# Patient Record
Sex: MALE | Race: OTHER | NOT HISPANIC OR LATINO | URBAN - METROPOLITAN AREA
[De-identification: names, ages, dates, MRNs, and addresses within clinical notes are randomized per-mention and may not be internally consistent; named-entity substitution may affect disease eponyms.]

---

## 2019-04-29 ENCOUNTER — EMERGENCY (EMERGENCY)
Facility: HOSPITAL | Age: 24
LOS: 0 days | Discharge: HOME | End: 2019-04-29
Attending: EMERGENCY MEDICINE | Admitting: EMERGENCY MEDICINE
Payer: SUBSIDIZED

## 2019-04-29 VITALS
DIASTOLIC BLOOD PRESSURE: 62 MMHG | HEART RATE: 68 BPM | RESPIRATION RATE: 18 BRPM | TEMPERATURE: 98 F | OXYGEN SATURATION: 99 % | SYSTOLIC BLOOD PRESSURE: 122 MMHG

## 2019-04-29 DIAGNOSIS — M79.672 PAIN IN LEFT FOOT: ICD-10-CM

## 2019-04-29 DIAGNOSIS — M79.673 PAIN IN UNSPECIFIED FOOT: ICD-10-CM

## 2019-04-29 DIAGNOSIS — Z79.1 LONG TERM (CURRENT) USE OF NON-STEROIDAL ANTI-INFLAMMATORIES (NSAID): ICD-10-CM

## 2019-04-29 PROCEDURE — 73630 X-RAY EXAM OF FOOT: CPT | Mod: 26,LT

## 2019-04-29 PROCEDURE — 99283 EMERGENCY DEPT VISIT LOW MDM: CPT

## 2019-04-29 RX ORDER — IBUPROFEN 200 MG
600 TABLET ORAL ONCE
Qty: 0 | Refills: 0 | Status: COMPLETED | OUTPATIENT
Start: 2019-04-29 | End: 2019-04-29

## 2019-04-29 RX ADMIN — Medication 600 MILLIGRAM(S): at 15:04
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
## 2019-04-29 NOTE — ED PROVIDER NOTE - NSFOLLOWUPCLINICS_GEN_ALL_ED_FT
[facility]  Podiatry  .  [place]   Phone: [phone]  Fax:   Follow Up Time:

## 2019-04-29 NOTE — ED PROVIDER NOTE - CLINICAL SUMMARY MEDICAL DECISION MAKING FREE TEXT BOX
Callous to the plantar surface of the left foot. No abscess. No sign of infection. X-ray with no foreign body.  A/P: NSAIDs. Will refer to podiatry for further management.
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
## 2019-04-29 NOTE — ED PROVIDER NOTE - PHYSICAL EXAMINATION
VITAL SIGNS: I have reviewed nursing notes and confirm.  CONSTITUTIONAL: Well-developed; well-nourished; in no acute distress. pt comfortable.  SKIN: skin exam is warm and dry, callous formation to l. foot at plantar of base where pt    HEAD: Normocephalic; atraumatic.  EYES:  conjunctiva and sclera clear.  EXT: Normal ROM. No  cyanosis or edema.  LYMPH: No acute cervical adenopathy.  NEURO: Alert, oriented, grossly unremarkable.  sensory intact.   PSYCH: Cooperative, appropriate.

## 2019-04-29 NOTE — ED PROVIDER NOTE - NSFOLLOWUPINSTRUCTIONS_ED_ALL_ED_FT
May do warm soaks of foot to try to soften area.     Foot Pain  ImageMany things can cause foot pain. Some common causes are:    An injury.  A sprain.  Arthritis.  Blisters.  Bunions.    Follow these instructions at home:  Pay attention to any changes in your symptoms. Take these actions to help with your discomfort:    If directed, put ice on the affected area:    Put ice in a plastic bag.  Place a towel between your skin and the bag.  Leave the ice on for 15–20 minutes, 3?4 times a day for 2 days.    Take over-the-counter and prescription medicines only as told by your health care provider.  Wear comfortable, supportive shoes that fit you well. Do not wear high heels.  Do not stand or walk for long periods of time.  Do not lift a lot of weight. This can put added pressure on your feet.  Do stretches to relieve foot pain and stiffness as told by your health care provider.  Rub your foot gently.  Keep your feet clean and dry.    Contact a health care provider if:  Your pain does not get better after a few days of self-care.  Your pain gets worse.  You cannot stand on your foot.  Get help right away if:  Your foot is numb or tingling.  Your foot or toes are swollen.  Your foot or toes turn white or blue.  You have warmth and redness along your foot.  This information is not intended to replace advice given to you by your health care provider. Make sure you discuss any questions you have with your health care provider.

## 2019-05-09 PROBLEM — Z00.00 ENCOUNTER FOR PREVENTIVE HEALTH EXAMINATION: Status: ACTIVE | Noted: 2019-05-09

## 2019-05-20 ENCOUNTER — APPOINTMENT (OUTPATIENT)
Dept: PODIATRY | Facility: CLINIC | Age: 24
End: 2019-05-20

## 2019-05-20 ENCOUNTER — EMERGENCY (EMERGENCY)
Facility: HOSPITAL | Age: 24
LOS: 0 days | Discharge: HOME | End: 2019-05-20

## 2019-05-20 VITALS
OXYGEN SATURATION: 100 % | TEMPERATURE: 98 F | SYSTOLIC BLOOD PRESSURE: 111 MMHG | RESPIRATION RATE: 20 BRPM | DIASTOLIC BLOOD PRESSURE: 68 MMHG | HEART RATE: 71 BPM

## 2019-05-20 VITALS — WEIGHT: 179.02 LBS

## 2019-05-20 DIAGNOSIS — Z02.9 ENCOUNTER FOR ADMINISTRATIVE EXAMINATIONS, UNSPECIFIED: ICD-10-CM

## 2019-05-22 ENCOUNTER — APPOINTMENT (OUTPATIENT)
Dept: PODIATRY | Facility: CLINIC | Age: 24
End: 2019-05-22
Payer: SUBSIDIZED

## 2019-05-22 ENCOUNTER — OUTPATIENT (OUTPATIENT)
Dept: OUTPATIENT SERVICES | Facility: HOSPITAL | Age: 24
LOS: 1 days | Discharge: HOME | End: 2019-05-22

## 2019-05-22 ENCOUNTER — LABORATORY RESULT (OUTPATIENT)
Age: 24
End: 2019-05-22

## 2019-05-22 VITALS — HEART RATE: 55 BPM | SYSTOLIC BLOOD PRESSURE: 124 MMHG | DIASTOLIC BLOOD PRESSURE: 77 MMHG

## 2019-05-22 VITALS — BODY MASS INDEX: 25.33 KG/M2 | HEIGHT: 72 IN | WEIGHT: 187 LBS

## 2019-05-22 DIAGNOSIS — Z78.9 OTHER SPECIFIED HEALTH STATUS: ICD-10-CM

## 2019-05-22 DIAGNOSIS — M79.672 PAIN IN LEFT FOOT: ICD-10-CM

## 2019-05-22 DIAGNOSIS — B07.0 PLANTAR WART: ICD-10-CM

## 2019-05-22 PROCEDURE — 99204 OFFICE O/P NEW MOD 45 MIN: CPT

## 2019-05-22 RX ORDER — FLUOROURACIL 50 MG/G
5 CREAM TOPICAL
Qty: 1 | Refills: 2 | Status: ACTIVE | COMMUNITY
Start: 2019-05-22 | End: 1900-01-01

## 2019-05-22 NOTE — PROCEDURE
[FreeTextEntry1] : Patient examined, history and chart reviewed\par Plantars wart of left foot debrided with # 15 blade and curette to the level of healthy subdermal epithelial tissue, pinpoint bleeding was noted and area was then cauterized using silver nitrate \par Discussed with patient cryotherapy and serial debridement of lesion till lesion is completely resolved\par Patient rx Efudex apply to left foot wart Daily\par Skin scrapping taken and to be sent to pathology for evaluation \par FOllow up in 3 weeks \par

## 2019-05-22 NOTE — HISTORY OF PRESENT ILLNESS
[FreeTextEntry1] : JADON COHEN   presents on 05/22/2019  for a foot examination, patient was referred by PCP. Patient complains of numbness and pain of  Left foot and denies an acute injury.  Patient describes the pain coming from a left foot callus.  Patient states the pain is local lized at the plantar foot lesion  Patient denies NVFC and denies SOB.\par

## 2019-05-22 NOTE — PHYSICAL EXAM
[General Appearance - Alert] : alert [General Appearance - In No Acute Distress] : in no acute distress [Full Pulse] : the pedal pulses are present [Edema] : there was no peripheral edema [Abnormal Walk] : normal gait [Nail Clubbing] : no clubbing  or cyanosis of the fingernails [Musculoskeletal - Swelling] : no joint swelling seen [Motor Tone] : muscle strength and tone were normal [Skin Color & Pigmentation] : normal skin color and pigmentation [Skin Turgor] : normal skin turgor [] : no rash [Right Foot Was Examined] : The right foot was examined [Left Foot Was Examined] : The left foot was examined [Normal in Appearance] : normal in appearance [Normal] : normal [Full ROM] : with full range of motion [2+] : 2+ in the dorsalis pedis [Deep Tendon Reflexes (DTR)] : deep tendon reflexes were 2+ and symmetric [Sensation] : the sensory exam was normal to light touch and pinprick [No Focal Deficits] : no focal deficits [Oriented To Time, Place, And Person] : oriented to person, place, and time [Impaired Insight] : insight and judgment were intact [Affect] : the affect was normal [Tenderness] : not tender [Erythema] : not erythematous [Delayed in the Right Toes] : normal in the toes [Swelling] : not swollen [Delayed in the Left Toes] : normal in the toes [Vibration Dec.] : normal vibratory sensation at the level of the toes [Position Sense Dec.] : normal position sense at the level of the toes [Diminished Throughout Left Foot] : normal tactile sensation with monofilament testing throughout left foot [FreeTextEntry5] : plantar foot lesion suspicious for Plantar wart

## 2019-05-23 DIAGNOSIS — M79.672 PAIN IN LEFT FOOT: ICD-10-CM

## 2019-05-23 DIAGNOSIS — B07.0 PLANTAR WART: ICD-10-CM

## 2019-05-31 DIAGNOSIS — B07.0 PLANTAR WART: ICD-10-CM

## 2019-05-31 DIAGNOSIS — Z78.9 OTHER SPECIFIED HEALTH STATUS: ICD-10-CM

## 2019-05-31 DIAGNOSIS — M79.672 PAIN IN LEFT FOOT: ICD-10-CM

## 2019-06-14 ENCOUNTER — APPOINTMENT (OUTPATIENT)
Dept: PODIATRY | Facility: CLINIC | Age: 24
End: 2019-06-14